# Patient Record
Sex: MALE | Race: WHITE | NOT HISPANIC OR LATINO | URBAN - METROPOLITAN AREA
[De-identification: names, ages, dates, MRNs, and addresses within clinical notes are randomized per-mention and may not be internally consistent; named-entity substitution may affect disease eponyms.]

---

## 2022-06-29 ENCOUNTER — EMERGENCY (EMERGENCY)
Facility: HOSPITAL | Age: 77
LOS: 1 days | Discharge: HOME | End: 2022-06-29
Attending: EMERGENCY MEDICINE
Payer: MEDICARE

## 2022-06-29 VITALS
DIASTOLIC BLOOD PRESSURE: 83 MMHG | SYSTOLIC BLOOD PRESSURE: 166 MMHG | TEMPERATURE: 98 F | HEART RATE: 80 BPM | OXYGEN SATURATION: 98 % | RESPIRATION RATE: 19 BRPM

## 2022-06-29 VITALS
TEMPERATURE: 98 F | DIASTOLIC BLOOD PRESSURE: 74 MMHG | OXYGEN SATURATION: 96 % | WEIGHT: 235.01 LBS | HEIGHT: 75 IN | SYSTOLIC BLOOD PRESSURE: 104 MMHG | RESPIRATION RATE: 18 BRPM | HEART RATE: 68 BPM

## 2022-06-29 LAB
ALBUMIN SERPL ELPH-MCNC: 4.9 G/DL — SIGNIFICANT CHANGE UP (ref 3.3–5)
ALP SERPL-CCNC: 116 U/L — SIGNIFICANT CHANGE UP (ref 40–120)
ALT FLD-CCNC: 14 U/L — SIGNIFICANT CHANGE UP (ref 10–45)
ANION GAP SERPL CALC-SCNC: 13 MMOL/L — SIGNIFICANT CHANGE UP (ref 5–17)
APTT BLD: 32.6 SEC — SIGNIFICANT CHANGE UP (ref 27.5–35.5)
AST SERPL-CCNC: 23 U/L — SIGNIFICANT CHANGE UP (ref 10–40)
BASOPHILS # BLD AUTO: 0.03 K/UL — SIGNIFICANT CHANGE UP (ref 0–0.2)
BASOPHILS NFR BLD AUTO: 0.5 % — SIGNIFICANT CHANGE UP (ref 0–2)
BILIRUB SERPL-MCNC: 0.5 MG/DL — SIGNIFICANT CHANGE UP (ref 0.2–1.2)
BUN SERPL-MCNC: 11 MG/DL — SIGNIFICANT CHANGE UP (ref 7–23)
CALCIUM SERPL-MCNC: 10.3 MG/DL — SIGNIFICANT CHANGE UP (ref 8.4–10.5)
CHLORIDE SERPL-SCNC: 99 MMOL/L — SIGNIFICANT CHANGE UP (ref 96–108)
CO2 SERPL-SCNC: 25 MMOL/L — SIGNIFICANT CHANGE UP (ref 22–31)
CREAT SERPL-MCNC: 0.88 MG/DL — SIGNIFICANT CHANGE UP (ref 0.5–1.3)
EGFR: 90 ML/MIN/1.73M2 — SIGNIFICANT CHANGE UP
EOSINOPHIL # BLD AUTO: 0.13 K/UL — SIGNIFICANT CHANGE UP (ref 0–0.5)
EOSINOPHIL NFR BLD AUTO: 2 % — SIGNIFICANT CHANGE UP (ref 0–6)
FLUAV AG NPH QL: SIGNIFICANT CHANGE UP
FLUBV AG NPH QL: SIGNIFICANT CHANGE UP
GLUCOSE SERPL-MCNC: 101 MG/DL — HIGH (ref 70–99)
HCT VFR BLD CALC: 39 % — SIGNIFICANT CHANGE UP (ref 39–50)
HGB BLD-MCNC: 12.3 G/DL — LOW (ref 13–17)
IMM GRANULOCYTES NFR BLD AUTO: 0.5 % — SIGNIFICANT CHANGE UP (ref 0–1.5)
INR BLD: 1.08 RATIO — SIGNIFICANT CHANGE UP (ref 0.88–1.16)
LYMPHOCYTES # BLD AUTO: 1.11 K/UL — SIGNIFICANT CHANGE UP (ref 1–3.3)
LYMPHOCYTES # BLD AUTO: 17.2 % — SIGNIFICANT CHANGE UP (ref 13–44)
MCHC RBC-ENTMCNC: 26.9 PG — LOW (ref 27–34)
MCHC RBC-ENTMCNC: 31.5 GM/DL — LOW (ref 32–36)
MCV RBC AUTO: 85.2 FL — SIGNIFICANT CHANGE UP (ref 80–100)
MONOCYTES # BLD AUTO: 0.47 K/UL — SIGNIFICANT CHANGE UP (ref 0–0.9)
MONOCYTES NFR BLD AUTO: 7.3 % — SIGNIFICANT CHANGE UP (ref 2–14)
NEUTROPHILS # BLD AUTO: 4.67 K/UL — SIGNIFICANT CHANGE UP (ref 1.8–7.4)
NEUTROPHILS NFR BLD AUTO: 72.5 % — SIGNIFICANT CHANGE UP (ref 43–77)
NRBC # BLD: 0 /100 WBCS — SIGNIFICANT CHANGE UP (ref 0–0)
PLATELET # BLD AUTO: 335 K/UL — SIGNIFICANT CHANGE UP (ref 150–400)
POTASSIUM SERPL-MCNC: 4.4 MMOL/L — SIGNIFICANT CHANGE UP (ref 3.5–5.3)
POTASSIUM SERPL-SCNC: 4.4 MMOL/L — SIGNIFICANT CHANGE UP (ref 3.5–5.3)
PROT SERPL-MCNC: 7.8 G/DL — SIGNIFICANT CHANGE UP (ref 6–8.3)
PROTHROM AB SERPL-ACNC: 12.5 SEC — SIGNIFICANT CHANGE UP (ref 10.5–13.4)
RBC # BLD: 4.58 M/UL — SIGNIFICANT CHANGE UP (ref 4.2–5.8)
RBC # FLD: 15.2 % — HIGH (ref 10.3–14.5)
RSV RNA NPH QL NAA+NON-PROBE: SIGNIFICANT CHANGE UP
SARS-COV-2 RNA SPEC QL NAA+PROBE: SIGNIFICANT CHANGE UP
SODIUM SERPL-SCNC: 137 MMOL/L — SIGNIFICANT CHANGE UP (ref 135–145)
WBC # BLD: 6.44 K/UL — SIGNIFICANT CHANGE UP (ref 3.8–10.5)
WBC # FLD AUTO: 6.44 K/UL — SIGNIFICANT CHANGE UP (ref 3.8–10.5)

## 2022-06-29 PROCEDURE — 85025 COMPLETE CBC W/AUTO DIFF WBC: CPT

## 2022-06-29 PROCEDURE — 93010 ELECTROCARDIOGRAM REPORT: CPT

## 2022-06-29 PROCEDURE — 96374 THER/PROPH/DIAG INJ IV PUSH: CPT

## 2022-06-29 PROCEDURE — 85610 PROTHROMBIN TIME: CPT

## 2022-06-29 PROCEDURE — 93005 ELECTROCARDIOGRAM TRACING: CPT

## 2022-06-29 PROCEDURE — 99283 EMERGENCY DEPT VISIT LOW MDM: CPT

## 2022-06-29 PROCEDURE — 86900 BLOOD TYPING SEROLOGIC ABO: CPT

## 2022-06-29 PROCEDURE — 36415 COLL VENOUS BLD VENIPUNCTURE: CPT

## 2022-06-29 PROCEDURE — 87637 SARSCOV2&INF A&B&RSV AMP PRB: CPT

## 2022-06-29 PROCEDURE — 86850 RBC ANTIBODY SCREEN: CPT

## 2022-06-29 PROCEDURE — 99285 EMERGENCY DEPT VISIT HI MDM: CPT | Mod: 25

## 2022-06-29 PROCEDURE — 96375 TX/PRO/DX INJ NEW DRUG ADDON: CPT

## 2022-06-29 PROCEDURE — 71045 X-RAY EXAM CHEST 1 VIEW: CPT

## 2022-06-29 PROCEDURE — 85730 THROMBOPLASTIN TIME PARTIAL: CPT

## 2022-06-29 PROCEDURE — 99284 EMERGENCY DEPT VISIT MOD MDM: CPT | Mod: 25

## 2022-06-29 PROCEDURE — 80053 COMPREHEN METABOLIC PANEL: CPT

## 2022-06-29 PROCEDURE — 86901 BLOOD TYPING SEROLOGIC RH(D): CPT

## 2022-06-29 PROCEDURE — 71045 X-RAY EXAM CHEST 1 VIEW: CPT | Mod: 26

## 2022-06-29 RX ORDER — GLUCAGON INJECTION, SOLUTION 0.5 MG/.1ML
1 INJECTION, SOLUTION SUBCUTANEOUS ONCE
Refills: 0 | Status: COMPLETED | OUTPATIENT
Start: 2022-06-29 | End: 2022-06-29

## 2022-06-29 RX ORDER — ONDANSETRON 8 MG/1
4 TABLET, FILM COATED ORAL ONCE
Refills: 0 | Status: COMPLETED | OUTPATIENT
Start: 2022-06-29 | End: 2022-06-29

## 2022-06-29 RX ORDER — PANTOPRAZOLE SODIUM 20 MG/1
40 TABLET, DELAYED RELEASE ORAL ONCE
Refills: 0 | Status: COMPLETED | OUTPATIENT
Start: 2022-06-29 | End: 2022-06-29

## 2022-06-29 RX ORDER — PANTOPRAZOLE SODIUM 20 MG/1
1 TABLET, DELAYED RELEASE ORAL
Qty: 28 | Refills: 0
Start: 2022-06-29 | End: 2022-07-12

## 2022-06-29 RX ORDER — SODIUM CHLORIDE 9 MG/ML
1000 INJECTION INTRAMUSCULAR; INTRAVENOUS; SUBCUTANEOUS ONCE
Refills: 0 | Status: COMPLETED | OUTPATIENT
Start: 2022-06-29 | End: 2022-06-29

## 2022-06-29 RX ADMIN — ONDANSETRON 4 MILLIGRAM(S): 8 TABLET, FILM COATED ORAL at 16:06

## 2022-06-29 RX ADMIN — PANTOPRAZOLE SODIUM 40 MILLIGRAM(S): 20 TABLET, DELAYED RELEASE ORAL at 16:49

## 2022-06-29 RX ADMIN — GLUCAGON INJECTION, SOLUTION 1 MILLIGRAM(S): 0.5 INJECTION, SOLUTION SUBCUTANEOUS at 16:10

## 2022-06-29 RX ADMIN — SODIUM CHLORIDE 1000 MILLILITER(S): 9 INJECTION INTRAMUSCULAR; INTRAVENOUS; SUBCUTANEOUS at 16:37

## 2022-06-29 NOTE — ED PROVIDER NOTE - NSFOLLOWUPCLINICS_GEN_ALL_ED_FT
[facility]  Gastroenterology  [address] [place] [address] [address]  [place] [address]  Phone: [phone]  Fax:

## 2022-06-29 NOTE — ED ADULT NURSE NOTE - OBJECTIVE STATEMENT
76 YO male with PMH of GERD via walk in presenting with complaints of abdominal pain. Pt states yesterday while eating steak, feeling like a piece of steak got stuck, pt states that this happens often but is usually able to cough it up after drinking warm water. Pt had no relief after interventions and has not been able to keep anything down since last night. pt currently c/o of mid upper abdominal pain describes as throbbing during intake. Pt Axox4,  Lungs clear throughout bilateral, respirations even, & non-labored.  pulses strong and equal bilaterally. Abdomen soft, non-tender, non-distended, denies pain/discomfort during palpations. Skin warm, dry, and intact. Pt denies chest pain, palpitations, shortness of breath, visual disturbances, numbness/tingling, fever, chills, diaphoresis, constipation, diarrhea, or urinary symptoms. Pt placed in position of comfort. friend at bedside. Bed in lowest position, wheels locked, appropriate side rails raised. Pt denies needs at this time.

## 2022-06-29 NOTE — ED PROVIDER NOTE - NSFOLLOWUPINSTRUCTIONS_ED_ALL_ED_FT
Follow up with your Primary Care Physician within the next 2-3 days  Bring a copy of your test results with you to your appointment  Continue your current medication regimen  Return to the Emergency Room if you experience new or worsening symptoms abdominal pain, nausea, vomiting, fever chills, cough, chest pain, shortness of breath, dizziness, slurred speech, weakness, gait abnormality    TAKE PROTONIX TWICE DAILY PRESCRIBED TO YOUR PHARMACY  START WITH SOFT FOOD DIET AND ADVANCE AS TOLERATED  FOLLOW UP WITH GASTROENTEROLOGY WITHIN 1 WEEK  [address] [facility] [address]  [place] [address]  Phone: [phone]

## 2022-06-29 NOTE — CONSULT NOTE ADULT - ASSESSMENT
Impression:  1. Dysphagia - likely passed food impaction; ddx includes stricture, erosive esophagitis  2. history of esophageal ulcers?    Plan:  -Patient got glucagon and is now able to swallow secretions  -Patient given 1 bottle of water at bedside  Impression:  1. Dysphagia - likely passed food impaction; ddx includes stricture, erosive esophagitis  2. history of esophageal ulcers?    Plan:  -Patient got glucagon and is now able to swallow secretions  -Patient given 1 bottle of 8oz water at bedside and able to swallow and drink it without any discomfort of nauesa/vomiting; suspect likely passed impaction  -Would place on PPI daily  -Avoid NSAIDs  -Give a soft meal to patient to consume; if able to tolerate PO, no objection to discharge patient home with outpatient follow-up  -Can f/u at [phone] upon discharge  -d/w ER team    Shane (Holden Millre) MD Gautam  GI iphone: [phone]  GI e-mail: [email or web address]

## 2022-06-29 NOTE — ED PROVIDER NOTE - ATTENDING APP SHARED VISIT CONTRIBUTION OF CARE
Attending MD Leon:   I personally have seen and examined this patient.  Physician assistant note reviewed and agree on plan of care and except where noted.  See below for details.     seen in [facility]    77M with PMH/PSH including GERD, previous food bolus impactions presents to the ED with concern for food bolus impaction.  Reports that last night had a piece of steak at around 8:30pm and since then has felt that it is "stuck" and points to epigastrium.  Reports this happens to him about "10 times a year" but it is typically "not this bad".  Reports he had another similarly "bad" episode about 10-12 yrs ago that necessitated presentation to hospital and endoscopy.  Reports that endoscopy showed "ulcerations".  Reports pain initially severe but has improved.  Reports has not been able to tolerate po since onset of symptoms and reports when he tries to drink water has to "spit it back up".  Reports is tolerating secretions.  Denies shortness of breath, other abdominal pain, nausea, vomiting, diarrhea, bloody or black stools, urinary complaints.  Denies recent illness, URI symptoms, cough, fevers, chills.  Reports his doctor retired about 5 yrs ago and does not have new PMD.    Exam:   General: NAD  HENT: head NCAT, airway patent, no foreign body visible, uvula midline, no pharyngeal erythema  Eyes: anicteric, no conjunctival injection   Lungs: lungs CTAB with good inspiratory effort, no wheezing, no rhonchi, no rales, no stridor  Cardiac: +S1S2, no obvious m/r/g  GI: abdomen soft with +BS, NT, ND  MSK: FROM at neck, ranging extremities freely  Neuro: moving all extremities spontaneously, nonfocal, no gross neuro deficits  Psych: normal mood and affect     A/P: 77M with concern for food impaction, will obtain preop labs, CXR, GI consult, will give Glucagon although explained to patient low suspicion that it will resolve his symptoms and will likely need admission for endoscopy, patient amenable

## 2022-06-29 NOTE — ED PROVIDER NOTE - PROGRESS NOTE DETAILS
ARTEMIO Natarajan: paged GI awaiting call back Attending note (Enmanuel): patient endorsed to me by prior physician, concern for esophageal food impaction; was given glucagon, now reports feeling much better, and tolerated liquid PO; will advanced to soft diet and observe in ED for short time, if continuing to tolerate po, can dc with GI f/u as outpatient. ARTEMIO Natarajan: patient tolerated water while GI at bedside. GI recommended to trial soft diet. Patient tolerated apple sauce and feels significantly improved since glucagon. cleared for discharge home with outpt f/u

## 2022-06-29 NOTE — ED PROVIDER NOTE - NS ED ATTENDING STATEMENT MOD
This was a shared visit with the YESENIA. I reviewed and verified the documentation and independently performed the documented:

## 2022-06-29 NOTE — CONSULT NOTE ADULT - SUBJECTIVE AND OBJECTIVE BOX
Chief Complaint:  Patient is a 77y old  Male who presents with a chief complaint of dysphagia    HPI: 77 year old male with depression, chronic back pain on chronic NSAIDs, presents with dysphagia. He ate steak last night around 8:30PM, and then developed epigastric pain and inability to swallow. He reports not being abl eto swallow water or his secretions at home, and that he would vomit everything up 5 minutes after trying PO intake. He reports feeling better now after being given glucagon in the ER and is able to swallow his secretions. He reports intermittent dysphagia prior to this, but usually is able to wash everything down. He reports an episode of dysphagia similar to this, and had an EGD at that time which showed "ulcerations" at the junction of  the esophagus/stomach. The EGD was done after the episode of dysphagia which resolved on its own 13 years ago. He denies weight loss. No melena/hematochezia. No recent endoscopy. He does take aspirin at least 2-3 times a day, and on top of that sometimes ibuprofen.    Allergies:  No Known Allergies      Home Medications: aspirin, iburpofen, Zoloft          PMHX/PSHX:  GERD (gastroesophageal reflux disease)  Depression        Family history:  No GI tract cancers    Social History: No ETOh, illicit drugs or alcohol    ROS:     General:  No wt loss, fevers, chills, night sweats, fatigue,   Eyes:  Good vision, no reported pain  ENT:  No sore throat, pain, runny nose, dysphagia  CV:  No pain, palpitations, hypo/hypertension  Resp:  No dyspnea, cough, tachypnea, wheezing  GI:  See HPI  :  No pain, bleeding, incontinence, nocturia  Muscle:  No pain, weakness  Neuro:  No weakness, tingling, memory problems  Psych:  No fatigue, insomnia, mood problems, depression  Endocrine:  No polyuria, polydipsia, cold/heat intolerance  Heme:  No petechiae, ecchymosis, easy bruisability  Skin:  No rash, edema      PHYSICAL EXAM:     GENERAL:  Appears stated age, well-groomed, well-nourished, no distress, VERY comfortable  HEENT:  NC/AT,  conjunctivae clear and pink,  no JVD,   CHEST:  Full & symmetric excursion, no increased effort, breath sounds clear  HEART:  Regular rhythm, S1, S2  ABDOMEN:  Soft, non-tender, non-distended, normoactive bowel sounds  EXTREMITIES:  no cyanosis,clubbing or edema  SKIN:  No rash/erythema/ecchymoses/petechiae/wounds/abscess/warm/dry  NEURO:  Alert, oriented x 3  PSYCH: normal affect    Vital Signs:  Vital Signs Last 24 Hrs  T(C): 36.8 (29 Jun 2022 15:10), Max: 36.8 (29 Jun 2022 15:10)  T(F): 98.3 (29 Jun 2022 15:10), Max: 98.3 (29 Jun 2022 15:10)  HR: 82 (29 Jun 2022 15:10) (68 - 82)  BP: 148/84 (29 Jun 2022 15:10) (104/74 - 148/84)  BP(mean): --  RR: 18 (29 Jun 2022 15:10) (18 - 18)  SpO2: 96% (29 Jun 2022 15:10) (96% - 96%)  Daily Height in cm: 190.5 (29 Jun 2022 14:39)    Daily     LABS:                        12.3   6.44  )-----------( 335      ( 29 Jun 2022 16:21 )             39.0     06-29    137  |  99  |  11  ----------------------------<  101<H>  4.4   |  25  |  0.88    Ca    10.3      29 Jun 2022 16:21    TPro  7.8  /  Alb  4.9  /  TBili  0.5  /  DBili  x   /  AST  23  /  ALT  14  /  AlkPhos  116  06-29    LIVER FUNCTIONS - ( 29 Jun 2022 16:21 )  Alb: 4.9 g/dL / Pro: 7.8 g/dL / ALK PHOS: 116 U/L / ALT: 14 U/L / AST: 23 U/L / GGT: x           PT/INR - ( 29 Jun 2022 16:21 )   PT: 12.5 sec;   INR: 1.08 ratio         PTT - ( 29 Jun 2022 16:21 )  PTT:32.6 sec

## 2022-06-29 NOTE — ED ADULT TRIAGE NOTE - CHIEF COMPLAINT QUOTE
pt states steak got stuck in esophagus last night, has not been able to tolerate PO intake since that time

## 2022-06-29 NOTE — ED PROVIDER NOTE - OBJECTIVE STATEMENT
77M with PMHx GERD presents to ED with epigastric discomfort after swallowing a piece of steak at 8:30 PM. Shortly after eating the steak he felt severe epigastric pain that has been constant since but has subsided currently 2/10 in severity. Patient unable to tolerate liquids or solids as he vomits shortly after. Of note pt had similar episode 10-12 years ago when he ate a steak and had an endoscopy that showed "ulcerations". Denies fevers, chills, difficulty breathing, radiating pain, back pain or abdominal pain. Denies voice changes, hoarseness, difficulty tolerating secretions

## 2022-06-29 NOTE — ED PROVIDER NOTE - PATIENT PORTAL LINK FT
You can access the FollowMyHealth Patient Portal offered by [facility] by registering at the following website: [email or web address]. By joining [employer]’s FollowMyHealth portal, you will also be able to view your health information using other applications (apps) compatible with our system.

## 2022-06-29 NOTE — ED ADULT NURSE REASSESSMENT NOTE - NS ED NURSE REASSESS COMMENT FT1
Pt given apple sauce, tolerated well, with no discomfort or difficulty. Pt states to feel better after medication, no longer feels discomfort/throbbing feeling. ED ARTEMIO Natarajan made aware.